# Patient Record
Sex: MALE | Race: WHITE | NOT HISPANIC OR LATINO | URBAN - METROPOLITAN AREA
[De-identification: names, ages, dates, MRNs, and addresses within clinical notes are randomized per-mention and may not be internally consistent; named-entity substitution may affect disease eponyms.]

---

## 2022-07-15 ENCOUNTER — CLAIMS CREATED FROM THE CLAIM WINDOW (OUTPATIENT)
Dept: URBAN - METROPOLITAN AREA CLINIC 88 | Facility: CLINIC | Age: 37
End: 2022-07-15
Payer: MEDICARE

## 2022-07-15 ENCOUNTER — WEB ENCOUNTER (OUTPATIENT)
Dept: URBAN - METROPOLITAN AREA CLINIC 88 | Facility: CLINIC | Age: 37
End: 2022-07-15

## 2022-07-15 ENCOUNTER — LAB OUTSIDE AN ENCOUNTER (OUTPATIENT)
Dept: URBAN - METROPOLITAN AREA CLINIC 88 | Facility: CLINIC | Age: 37
End: 2022-07-15

## 2022-07-15 ENCOUNTER — DASHBOARD ENCOUNTERS (OUTPATIENT)
Age: 37
End: 2022-07-15

## 2022-07-15 VITALS
TEMPERATURE: 98.2 F | HEART RATE: 71 BPM | HEIGHT: 71 IN | WEIGHT: 254 LBS | SYSTOLIC BLOOD PRESSURE: 150 MMHG | BODY MASS INDEX: 35.56 KG/M2 | DIASTOLIC BLOOD PRESSURE: 79 MMHG

## 2022-07-15 DIAGNOSIS — R05.3 CHRONIC COUGH: ICD-10-CM

## 2022-07-15 DIAGNOSIS — R19.8 ALTERNATING CONSTIPATION AND DIARRHEA: ICD-10-CM

## 2022-07-15 DIAGNOSIS — K21.9 GASTROESOPHAGEAL REFLUX DISEASE, UNSPECIFIED WHETHER ESOPHAGITIS PRESENT: ICD-10-CM

## 2022-07-15 DIAGNOSIS — R49.0 HOARSENESS OF VOICE: ICD-10-CM

## 2022-07-15 PROCEDURE — 99204 OFFICE O/P NEW MOD 45 MIN: CPT | Performed by: NURSE PRACTITIONER

## 2022-07-15 RX ORDER — ESCITALOPRAM 10 MG/1
TABLET, FILM COATED ORAL
Qty: 30 UNSPECIFIED | Status: ACTIVE | COMMUNITY

## 2022-07-15 RX ORDER — LORAZEPAM 0.5 MG/1
TABLET ORAL
Qty: 18 TABLET | Status: ACTIVE | COMMUNITY

## 2022-07-15 RX ORDER — LINACLOTIDE 72 UG/1
1 CAPSULE AT LEAST 30 MINUTES BEFORE THE FIRST MEAL OF THE DAY ON AN EMPTY STOMACH CAPSULE, GELATIN COATED ORAL ONCE A DAY
Status: ACTIVE | COMMUNITY

## 2022-07-15 RX ORDER — TIZANIDINE 4 MG/1
TABLET ORAL
Qty: 21 TABLET | Status: ACTIVE | COMMUNITY

## 2022-07-15 RX ORDER — SUCRALFATE 1 G/1
TAKE 1 TABLET BY MOUTH TWICE DAILY ON AN EMPTY STOMACH TABLET ORAL
Qty: 180 EACH | Refills: 0 | Status: ACTIVE | COMMUNITY

## 2022-07-15 RX ORDER — PANTOPRAZOLE SODIUM 40 MG/1
1 TABLET TABLET, DELAYED RELEASE ORAL
Qty: 90 | Refills: 2 | OUTPATIENT
Start: 2022-07-15

## 2022-07-15 NOTE — HPI-TODAY'S VISIT:
Referred by Rosaura Hernadez NP for evaluation and management of GERD.  A copy of this note will be sent to the referring provider.  Patient voices onset of reflux over the last year and has progressively since then.  Voices onset of varying degree of hoarseness, nighttime coughing with occasional shorness of breath.  Denies symptoms of sour regurgitation, dysphagia, loss of appetite, nausea, vomiting, or signs of GI blood loss.   Denies use of medication for GERD or prior EGD.  Voices constipation alternating diarrhea.  Stools are usually loose with urgency.  Defecation can occur daily to every 4-5 days.  Fails to experience complete sense of evacuation.  Was given Lizness 72 mcg, which he takes as needed.  Voices a complete evacuation with use but feels can be too strong at times.  Diagnosed with DM a year ago.  Denies rectal bleeding or prior colonoscopy.

## 2022-07-18 PROBLEM — 235595009: Status: ACTIVE | Noted: 2022-07-15

## 2022-07-26 ENCOUNTER — OFFICE VISIT (OUTPATIENT)
Dept: URBAN - METROPOLITAN AREA SURGERY CENTER 24 | Facility: SURGERY CENTER | Age: 37
End: 2022-07-26